# Patient Record
Sex: FEMALE | Race: WHITE | NOT HISPANIC OR LATINO | Employment: STUDENT | URBAN - METROPOLITAN AREA
[De-identification: names, ages, dates, MRNs, and addresses within clinical notes are randomized per-mention and may not be internally consistent; named-entity substitution may affect disease eponyms.]

---
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
## 2024-02-19 ENCOUNTER — OFFICE VISIT (OUTPATIENT)
Dept: URGENT CARE | Facility: CLINIC | Age: 19
End: 2024-02-19
Payer: COMMERCIAL

## 2024-02-19 VITALS
HEART RATE: 118 BPM | WEIGHT: 112 LBS | RESPIRATION RATE: 14 BRPM | BODY MASS INDEX: 19.12 KG/M2 | TEMPERATURE: 99 F | DIASTOLIC BLOOD PRESSURE: 76 MMHG | HEIGHT: 64 IN | OXYGEN SATURATION: 100 % | SYSTOLIC BLOOD PRESSURE: 112 MMHG

## 2024-02-19 DIAGNOSIS — R30.0 DYSURIA: Primary | ICD-10-CM

## 2024-02-19 DIAGNOSIS — N30.00 ACUTE CYSTITIS WITHOUT HEMATURIA: ICD-10-CM

## 2024-02-19 LAB
BILIRUB UR QL STRIP: NEGATIVE
GLUCOSE UR QL STRIP: NEGATIVE
KETONES UR QL STRIP: NEGATIVE
LEUKOCYTE ESTERASE UR QL STRIP: POSITIVE
PH, POC UA: 6
POC BLOOD, URINE: NEGATIVE
POC NITRATES, URINE: NEGATIVE
PROT UR QL STRIP: NEGATIVE
SP GR UR STRIP: 1.02 (ref 1–1.03)
UROBILINOGEN UR STRIP-ACNC: POSITIVE (ref 0.1–1.1)

## 2024-02-19 PROCEDURE — 99213 OFFICE O/P EST LOW 20 MIN: CPT | Mod: ,,, | Performed by: NURSE PRACTITIONER

## 2024-02-19 PROCEDURE — 81003 URINALYSIS AUTO W/O SCOPE: CPT | Mod: QW,,, | Performed by: NURSE PRACTITIONER

## 2024-02-19 RX ORDER — NITROFURANTOIN 25; 75 MG/1; MG/1
100 CAPSULE ORAL 2 TIMES DAILY
Qty: 10 CAPSULE | Refills: 0 | Status: SHIPPED | OUTPATIENT
Start: 2024-02-19 | End: 2024-02-24

## 2024-02-19 RX ORDER — FLUCONAZOLE 150 MG/1
150 TABLET ORAL DAILY
Qty: 1 TABLET | Refills: 0 | Status: SHIPPED | OUTPATIENT
Start: 2024-02-19 | End: 2024-02-20

## 2024-02-19 NOTE — PATIENT INSTRUCTIONS
Will culture urine.  Takes about 3 days to finalize. Will call you with results. In the meantime take antibiotic as directed. Force fluids, Tylenol or Motrin for discomfort. ER for severe worsening.

## 2024-02-19 NOTE — PROGRESS NOTES
"Subjective:      Patient ID: Kavya Mc is a 18 y.o. female.    Vitals:  height is 5' 4" (1.626 m) and weight is 50.8 kg (112 lb). Her temperature is 99.2 °F (37.3 °C). Her blood pressure is 112/76 and her pulse is 118 (abnormal). Her respiration is 14 and oxygen saturation is 100%.     Chief Complaint: Dysuria (Pain and burning with urination, frquency x3 days. Taking AZO pills.)    18-year-old female presents today with complaints of burning urination x3 days.  Denies any fever body aches hematuria has been using AZ over-the-counter with moderate symptom relief.      ROS   Objective:     Physical Exam   Constitutional: She is oriented to person, place, and time. She appears well-developed. She is cooperative.  Non-toxic appearance. She does not appear ill. No distress.   HENT:   Head: Normocephalic and atraumatic.   Ears:   Right Ear: Hearing, tympanic membrane, external ear and ear canal normal.   Left Ear: Hearing, tympanic membrane, external ear and ear canal normal.   Nose: Nose normal. No mucosal edema, rhinorrhea or nasal deformity. No epistaxis. Right sinus exhibits no maxillary sinus tenderness and no frontal sinus tenderness. Left sinus exhibits no maxillary sinus tenderness and no frontal sinus tenderness.   Mouth/Throat: Uvula is midline, oropharynx is clear and moist and mucous membranes are normal. No trismus in the jaw. Normal dentition. No uvula swelling. No oropharyngeal exudate, posterior oropharyngeal edema or posterior oropharyngeal erythema.   Eyes: Conjunctivae and lids are normal. No scleral icterus.   Neck: Trachea normal and phonation normal. Neck supple. No edema present. No erythema present. No neck rigidity present.   Cardiovascular: Normal rate, regular rhythm, normal heart sounds and normal pulses.   Pulmonary/Chest: Effort normal and breath sounds normal. No respiratory distress. She has no decreased breath sounds. She has no rhonchi.   Abdominal: Normal appearance. " "  Musculoskeletal: Normal range of motion.         General: No deformity. Normal range of motion.   Neurological: She is alert and oriented to person, place, and time. She exhibits normal muscle tone. Coordination normal.   Skin: Skin is warm, dry, intact, not diaphoretic and not pale.   Psychiatric: Her speech is normal and behavior is normal. Judgment and thought content normal.   Nursing note and vitals reviewed.         Previous History      Review of patient's allergies indicates:  No Known Allergies    History reviewed. No pertinent past medical history.  Current Outpatient Medications   Medication Instructions    fluconazole (DIFLUCAN) 150 mg, Oral, Daily    nitrofurantoin, macrocrystal-monohydrate, (MACROBID) 100 MG capsule 100 mg, Oral, 2 times daily    norethindrone-ethinyl estradiol (JUNEL FE 1/20) 1 mg-20 mcg (21)/75 mg (7) per tablet 1 tablet, Oral, Daily     Past Surgical History:   Procedure Laterality Date    APPENDECTOMY           Social History     Tobacco Use    Smoking status: Never     Passive exposure: Never    Smokeless tobacco: Never   Substance Use Topics    Alcohol use: Never        Physical Exam      Vital Signs Reviewed   /76   Pulse (!) 118   Temp 99.2 °F (37.3 °C)   Resp 14   Ht 5' 4" (1.626 m)   Wt 50.8 kg (112 lb)   SpO2 100%   BMI 19.22 kg/m²        Procedures    Procedures     Labs     Results for orders placed or performed in visit on 02/19/24   POCT Urinalysis, Dipstick, Automated, W/O Scope   Result Value Ref Range    POC Blood, Urine Negative Negative    POC Bilirubin, Urine Negative Negative    POC Urobilinogen, Urine Positive (A) 0.1 - 1.1    POC Ketones, Urine Negative Negative    POC Protein, Urine Negative Negative    POC Nitrates, Urine Negative Negative    POC Glucose, Urine Negative Negative    pH, UA 6     POC Specific Gravity, Urine 1.020 1.003 - 1.029    POC Leukocytes, Urine Positive (A) Negative      Assessment:     1. Dysuria    2. Acute cystitis " without hematuria        Plan:       Dysuria  -     POCT Urinalysis, Dipstick, Automated, W/O Scope    Acute cystitis without hematuria  -     Urine Culture High Risk    Other orders  -     nitrofurantoin, macrocrystal-monohydrate, (MACROBID) 100 MG capsule; Take 1 capsule (100 mg total) by mouth 2 (two) times daily. for 5 days  Dispense: 10 capsule; Refill: 0  -     fluconazole (DIFLUCAN) 150 MG Tab; Take 1 tablet (150 mg total) by mouth once daily. for 1 day  Dispense: 1 tablet; Refill: 0

## 2024-02-21 LAB — BACTERIA UR CULT: ABNORMAL

## 2024-05-21 ENCOUNTER — OFFICE VISIT (OUTPATIENT)
Dept: URGENT CARE | Facility: CLINIC | Age: 19
End: 2024-05-21
Payer: COMMERCIAL

## 2024-05-21 VITALS
RESPIRATION RATE: 16 BRPM | OXYGEN SATURATION: 99 % | HEIGHT: 64 IN | BODY MASS INDEX: 20.49 KG/M2 | TEMPERATURE: 99 F | HEART RATE: 102 BPM | WEIGHT: 120 LBS | SYSTOLIC BLOOD PRESSURE: 120 MMHG | DIASTOLIC BLOOD PRESSURE: 78 MMHG

## 2024-05-21 DIAGNOSIS — J02.9 SORE THROAT: Primary | ICD-10-CM

## 2024-05-21 LAB
CTP QC/QA: YES
MOLECULAR STREP A: NEGATIVE

## 2024-05-21 PROCEDURE — 99212 OFFICE O/P EST SF 10 MIN: CPT | Mod: ,,, | Performed by: FAMILY MEDICINE

## 2024-05-21 PROCEDURE — 87651 STREP A DNA AMP PROBE: CPT | Mod: QW,,, | Performed by: FAMILY MEDICINE

## 2024-05-21 NOTE — PROGRESS NOTES
"Subjective:      Patient ID: Kavya Mc is a 18 y.o. female.    Vitals:  height is 5' 4" (1.626 m) and weight is 54.4 kg (120 lb). Her temperature is 99 °F (37.2 °C). Her blood pressure is 120/78 and her pulse is 102. Her respiration is 16 and oxygen saturation is 99%.     Chief Complaint: Sore Throat    Patient is a 18 y.o. female who presents to urgent care with complaints of sore throat, skin rash that itches on hands and feet x this morning. Alleviating factors include none. Patient denies fever or any other symptoms.     Sore Throat       HENT:  Positive for sore throat.     ROS:  Constitutional : _ no fever, no headache  Eyes : _No redness, drainage or pain  HENT_sore throat, postnasal drainage  Respiratory_no wheezing, no shortness of breath  Cardiovascular_no chest pain  Gastrointestinal_ denies nausea vomiting or abdominal pain  Musculoskeletal_no joint pain, no joint swelling  Integumentary_ skin rash hand and feet started this morning associated with itching    Reviewed nurse's notes and vital signs, no fever in the clinic.  Patient currently on birth control, follows up with primary MD.  Presents with history of itchy rash bilateral hand and feet since morning.  Feeling feverish.  Associated with sore throat.  No difficulty swallowing, no headache.  No concerns of positive exposure to allergen.  Denies shortness or breath or wheezing.    Objective:     Physical Exam  General : Alert and Oriented, No apparent distress, febrile to touch, appears comfortable on the exam table  Neck - supple, shotty anterior cervical lymph nodes pressure to palpate, nontender  HENT : Oropharynx no redness or swelling. Tonsils 2+ bilateral, no exudate, bilateral TMs intact mild fluid no redness.   Respiratory : Bilateral equal breath sounds, nonlabored respirations  Cardiovascular : Rate, rhythm regular, normal volume pulse, no murmur  Gastrointestinal: Full abdomen, soft, nontender to palpate  Integumentary : Warm, Dry " and faint erythematous rash bilateral wrist palmar aspect    Assessment:     1. Sore throat      Plan:   Discussed in detail on physical finding, concerns of possible early testing examination.  Encouraged to monitor the symptoms closely.  Adequate hydration.  Strep test negative today.  Symptomatic treatment for now.  Claritin or Allegra for congestion.  Warm saltwater gargles for sore throat  Can alternate Tylenol ibuprofen as needed for fever sore throat and body aches  For worsening sore throat and fever can return to clinic for repeat strep test in 1or 2 days as nurse's visit  Voiced understanding.  Work excuse for tomorrow    Sore throat  -     POCT Strep A, Molecular

## 2024-05-21 NOTE — PATIENT INSTRUCTIONS
Discussed in detail on physical finding, concerns of possible early testing examination.  Encouraged to monitor the symptoms closely.  Adequate hydration.  Strep test negative today.  Symptomatic treatment for now.  Claritin or Allegra for congestion.  Warm saltwater gargles for sore throat  Can alternate Tylenol ibuprofen as needed for fever sore throat and body aches  For worsening sore throat and fever can return to clinic for repeat strep test in 1or 2 days as nurse's visit  Voiced understanding.  Work excuse for tomorrow